# Patient Record
Sex: MALE | Race: WHITE
[De-identification: names, ages, dates, MRNs, and addresses within clinical notes are randomized per-mention and may not be internally consistent; named-entity substitution may affect disease eponyms.]

---

## 2016-08-03 VITALS
SYSTOLIC BLOOD PRESSURE: 109 MMHG | SYSTOLIC BLOOD PRESSURE: 109 MMHG | DIASTOLIC BLOOD PRESSURE: 69 MMHG | DIASTOLIC BLOOD PRESSURE: 69 MMHG

## 2017-08-29 ENCOUNTER — HOSPITAL ENCOUNTER (OUTPATIENT)
Dept: HOSPITAL 44 - LAB | Age: 67
End: 2017-08-29
Attending: NURSE PRACTITIONER
Payer: COMMERCIAL

## 2017-08-29 DIAGNOSIS — M81.0: Primary | ICD-10-CM

## 2017-08-29 PROCEDURE — 82306 VITAMIN D 25 HYDROXY: CPT

## 2017-08-29 PROCEDURE — 84403 ASSAY OF TOTAL TESTOSTERONE: CPT

## 2017-08-29 PROCEDURE — 36415 COLL VENOUS BLD VENIPUNCTURE: CPT

## 2017-08-29 PROCEDURE — 83970 ASSAY OF PARATHORMONE: CPT

## 2017-11-28 ENCOUNTER — HOSPITAL ENCOUNTER (OUTPATIENT)
Dept: HOSPITAL 44 - CARD | Age: 67
End: 2017-11-28
Attending: INTERNAL MEDICINE
Payer: COMMERCIAL

## 2017-11-28 DIAGNOSIS — J43.9: ICD-10-CM

## 2017-11-28 DIAGNOSIS — Z79.899: ICD-10-CM

## 2017-11-28 DIAGNOSIS — Z72.0: ICD-10-CM

## 2017-11-28 DIAGNOSIS — I42.9: Primary | ICD-10-CM

## 2017-11-28 DIAGNOSIS — Z86.79: ICD-10-CM

## 2017-11-28 DIAGNOSIS — Z95.0: ICD-10-CM

## 2018-05-25 ENCOUNTER — HOSPITAL ENCOUNTER (EMERGENCY)
Dept: HOSPITAL 44 - ED | Age: 68
Discharge: HOME | End: 2018-05-25
Payer: MEDICARE

## 2018-05-25 VITALS
DIASTOLIC BLOOD PRESSURE: 68 MMHG | SYSTOLIC BLOOD PRESSURE: 110 MMHG | DIASTOLIC BLOOD PRESSURE: 68 MMHG | SYSTOLIC BLOOD PRESSURE: 110 MMHG | DIASTOLIC BLOOD PRESSURE: 68 MMHG | SYSTOLIC BLOOD PRESSURE: 110 MMHG

## 2018-05-25 DIAGNOSIS — J40: Primary | ICD-10-CM

## 2018-05-25 LAB
BASOPHILS NFR BLD: 0.7 % (ref 0–1.5)
EGFR (AFRICAN): > 60
EGFR (NON-AFRICAN): > 60
EOSINOPHIL NFR BLD: 3.5 % (ref 0–6.8)
MCH RBC QN AUTO: 33.6 PG (ref 28–34)
MCV RBC AUTO: 103.7 FL (ref 80–100)
MONOCYTES %: 6.4 % (ref 0–11)
NEUTROPHILS #: 6 # K/UL (ref 1.4–7.7)

## 2018-05-25 PROCEDURE — 71046 X-RAY EXAM CHEST 2 VIEWS: CPT

## 2018-05-25 PROCEDURE — 99284 EMERGENCY DEPT VISIT MOD MDM: CPT

## 2018-05-25 PROCEDURE — 85025 COMPLETE CBC W/AUTO DIFF WBC: CPT

## 2018-05-25 PROCEDURE — 80053 COMPREHEN METABOLIC PANEL: CPT

## 2018-05-25 PROCEDURE — 36415 COLL VENOUS BLD VENIPUNCTURE: CPT

## 2018-05-25 NOTE — ED PHYSICIAN DOCUMENTATION
General Adult





- HISTORIAN


Historian: patient





- HPI


Stated Complaint: cough x 5 days


Chief Complaint: Cough/ Upper Respiratory


Onset: days ago (5)


Timing: still present


Severity: mild


Further Comments: yes (He states 5 days ago he notes nasal drainge, fever, 

chills, cough (productive) and fatigue . He has not taken any OTC meds . He 

denies any sick contacts. No edema. No other complaints)


Last known Well Code/Unknown Code: Unknown





- ROS


CONST: fever


EYES/ENT: nasal drainage


CVS/RESP: shortness of breath, cough


GI/: none


MS/SKIN/LYMPH: none


NEURO/PSYCH: denies: headache, dizziness





- PAST HX


Past History: hypertension, other (GERD, CAD, )


Surgeries/Procedures: none


Immunizations: UTD


Allergies/Adverse Reactions: 


 Allergies











Allergy/AdvReac Type Severity Reaction Status Date / Time


 


No Known Drug Allergies Allergy   Verified 08/03/16 21:08














Home Medications: 


 Ambulatory Orders











 Medication  Instructions  Recorded


 


Amiodarone HCl [Pacerone] 200 mg PO DAILY 08/03/16


 


Carvedilol [Coreg] 3.125 mg PO DAILY 08/03/16


 


Diclofenac Sodium [Voltaren] 50 mg PO BID 05/25/18


 


Lisinopril [Lisinopril] 5 mg PO DAILY 05/25/18


 


Omeprazole [Omeprazole] 20 mg PO DAILY 05/25/18














- SOCIAL HX


Smoking History: cigarettes


Alcohol Use: none


Drug Use: none





- FAMILY HX


Family History: No





- VITAL SIGNS


Vital Signs: 





 Vital Signs











Temp Pulse Resp BP Pulse Ox


 


          109/69    


 


          08/03/16 21:39   














- REVIEWED ASSESSMENTS


Nursing Assessment  Reviewed: Yes


Vitals Reviewed: Yes





ED Results Lab/Radiology





- Lab Results


Lab Results: 





PA and lateral chest 


Clinical history: CXR, COUGH X5 DAYS, PT STATES HX OF COPD AND SMOKER 


Findings: Examination of the chest in PA and lateral views demonstrate some 

lungs to be hyperinflated but clear. Cardiac silhouette is within normal limits 

and the aorta is atherosclerotic. Bipolar pacemaker overlies left hemithorax. 


Impression: 


1. Hyperinflation. 


2. No active disease. 


Electronically signed on May 25, 2018 3:04:20 PM CDT by: 


Bijan Mills





General Adult Physical Exam





- PHYSICAL EXAM


GENERAL APPEARANCE: no distress


EENT: eye inspection normal, ENT inspection normal


NECK: normal inspection


RESPIRATORY: no resp distress, chest non-tender, wheezes


CVS: reg rate & rhythm, heart sounds normal, equal pulses, no murmur


ABDOMEN: soft, no distension


BACK: normal inspection


SKIN: warm/dry, normal color


EXTREMITIES: non-tender, normal range of motion, no evidence of injury, no edema


NEURO: oriented X3, CN's nml as tested, motor nml, sensation nml, mood/affect 

nml, cognition normal





Discharge


Clincal Impression: 


 Bronchitis





Referrals: 


Arthur Bahena,  [REFERRING] - 2 Days


Comments: 





1. Azithromycin - 250 mg (zpack) take as directed


2. Medrol Dose pack 4 mg - take as directed


3. ProAir 90 mcg Inhale 2 puffs by mouth every 4-6 hours as needed for cough


4. Increase fluids


5. rest 


6. Follow up with PCP in 2-4 days


7. Return to ER for any concerns, increased shortness of air, fever, or other 

concerns 


Condition: Stable


Disposition: 01 HOME, SELF-CARE


Decision to Admit: NO


Date of Decison to Admit: 05/25/18


Decision Time: 15:09

## 2018-05-25 NOTE — DIAGNOSTIC IMAGING REPORT
ELMA SALGADO 

[facility]

[address] [facility] [address]

[place], [place]. [address]

 

 

 

 

Report Submission Date: May 25, 2018 3:04:20 PM CDT

Patient       Study

Name:   MATTHEW CAVAZOS       Date:   May 25, 2018 2:40:11 PM CDT

MRN:   [id number]       Modality Type:   DX

Gender:   M       Description:   CHEST

:   50       Institution:   [facility]

Physician:   ELMA SALGADO

     Accession:    [id number]

 

 

PA and lateral chest 

Clinical history: CXR, COUGH X5 DAYS, PT STATES HX OF COPD AND SMOKER 

Findings: Examination of the chest in PA and lateral views demonstrate some 
lungs to be hyperinflated but clear. Cardiac silhouette is within normal limits 
and the aorta is atherosclerotic. Bipolar pacemaker overlies left hemithorax. 

Impression: 

1. Hyperinflation. 

2. No active disease.

 

Electronically signed on May 25, 2018 3:04:20 PM CDT by:

Bijan DICKSON

## 2018-07-10 ENCOUNTER — HOSPITAL ENCOUNTER (OUTPATIENT)
Dept: HOSPITAL 44 - CARD | Age: 68
End: 2018-07-10
Attending: INTERNAL MEDICINE
Payer: MEDICARE

## 2018-07-10 DIAGNOSIS — J43.9: ICD-10-CM

## 2018-07-10 DIAGNOSIS — Z79.899: ICD-10-CM

## 2018-07-10 DIAGNOSIS — Z95.0: ICD-10-CM

## 2018-07-10 DIAGNOSIS — Z86.79: ICD-10-CM

## 2018-07-10 DIAGNOSIS — I42.9: Primary | ICD-10-CM

## 2018-07-10 DIAGNOSIS — Z72.0: ICD-10-CM

## 2018-07-10 DIAGNOSIS — I48.91: ICD-10-CM

## 2018-09-25 ENCOUNTER — HOSPITAL ENCOUNTER (OUTPATIENT)
Dept: HOSPITAL 44 - CARD | Age: 68
End: 2018-09-25
Attending: INTERNAL MEDICINE
Payer: MEDICARE

## 2018-09-25 DIAGNOSIS — Z72.0: ICD-10-CM

## 2018-09-25 DIAGNOSIS — Z95.0: ICD-10-CM

## 2018-09-25 DIAGNOSIS — J43.9: ICD-10-CM

## 2018-09-25 DIAGNOSIS — I42.9: Primary | ICD-10-CM

## 2018-09-25 DIAGNOSIS — Z86.79: ICD-10-CM

## 2018-11-23 ENCOUNTER — HOSPITAL ENCOUNTER (INPATIENT)
Dept: HOSPITAL 44 - ED | Age: 68
LOS: 2 days | Discharge: HOME | DRG: 193 | End: 2018-11-25
Attending: FAMILY MEDICINE | Admitting: FAMILY MEDICINE
Payer: MEDICARE

## 2018-11-23 VITALS — BODY MASS INDEX: 25.4 KG/M2

## 2018-11-23 DIAGNOSIS — J80: ICD-10-CM

## 2018-11-23 DIAGNOSIS — I50.9: ICD-10-CM

## 2018-11-23 DIAGNOSIS — J18.1: Primary | ICD-10-CM

## 2018-11-23 DIAGNOSIS — F17.210: ICD-10-CM

## 2018-11-23 DIAGNOSIS — J43.9: ICD-10-CM

## 2018-11-23 DIAGNOSIS — R09.02: ICD-10-CM

## 2018-11-23 PROCEDURE — 82550 ASSAY OF CK (CPK): CPT

## 2018-11-23 PROCEDURE — 71046 X-RAY EXAM CHEST 2 VIEWS: CPT

## 2018-11-23 PROCEDURE — 84484 ASSAY OF TROPONIN QUANT: CPT

## 2018-11-23 PROCEDURE — 80048 BASIC METABOLIC PNL TOTAL CA: CPT

## 2018-11-23 PROCEDURE — 87040 BLOOD CULTURE FOR BACTERIA: CPT

## 2018-11-23 PROCEDURE — 85025 COMPLETE CBC W/AUTO DIFF WBC: CPT

## 2018-11-23 PROCEDURE — 99284 EMERGENCY DEPT VISIT MOD MDM: CPT

## 2018-11-23 PROCEDURE — 99238 HOSP IP/OBS DSCHRG MGMT 30/<: CPT

## 2018-11-23 PROCEDURE — 94640 AIRWAY INHALATION TREATMENT: CPT

## 2018-11-23 PROCEDURE — 80053 COMPREHEN METABOLIC PANEL: CPT

## 2018-11-23 PROCEDURE — 83880 ASSAY OF NATRIURETIC PEPTIDE: CPT

## 2018-11-23 PROCEDURE — S1016 NON-PVC INTRAVENOUS ADMINIST: HCPCS

## 2018-11-23 PROCEDURE — 99285 EMERGENCY DEPT VISIT HI MDM: CPT

## 2018-11-23 PROCEDURE — 99222 1ST HOSP IP/OBS MODERATE 55: CPT

## 2018-11-23 PROCEDURE — 82553 CREATINE MB FRACTION: CPT

## 2018-11-23 PROCEDURE — 71045 X-RAY EXAM CHEST 1 VIEW: CPT

## 2018-11-23 PROCEDURE — 93005 ELECTROCARDIOGRAM TRACING: CPT

## 2018-11-23 PROCEDURE — 96365 THER/PROPH/DIAG IV INF INIT: CPT

## 2018-11-24 LAB
BASOPHILS NFR BLD: 0.5 % (ref 0–1.5)
BASOPHILS NFR BLD: 0.8 % (ref 0–1.5)
EGFR (NON-AFRICAN): > 60
EGFR (NON-AFRICAN): > 60
EOSINOPHIL NFR BLD: 1.2 % (ref 0–6.8)
EOSINOPHIL NFR BLD: 2.1 % (ref 0–6.8)
MCH RBC QN AUTO: 33.3 PG (ref 28–34)
MCH RBC QN AUTO: 34.2 PG (ref 28–34)
MCV RBC AUTO: 100 FL (ref 80–100)
MCV RBC AUTO: 99 FL (ref 80–100)
MONOCYTES %: 5.4 % (ref 0–11)
MONOCYTES %: 9.2 % (ref 0–11)
NEUTROPHILS #: 13.8 # K/UL (ref 1.4–7.7)
NEUTROPHILS #: 16.6 # K/UL (ref 1.4–7.7)

## 2018-11-24 RX ADMIN — SODIUM CHLORIDE, PRESERVATIVE FREE SCH ML: 5 INJECTION INTRAVENOUS at 08:20

## 2018-11-24 RX ADMIN — AZITHROMYCIN SCH MG: 250 TABLET, FILM COATED ORAL at 16:02

## 2018-11-24 RX ADMIN — AZITHROMYCIN SCH MG: 250 TABLET, FILM COATED ORAL at 02:29

## 2018-11-24 RX ADMIN — CARVEDILOL SCH MG: 6.25 TABLET, FILM COATED ORAL at 08:16

## 2018-11-24 RX ADMIN — IPRATROPIUM BROMIDE AND ALBUTEROL SULFATE SCH ML: .5; 3 SOLUTION RESPIRATORY (INHALATION) at 12:18

## 2018-11-24 RX ADMIN — CARVEDILOL SCH: 6.25 TABLET, FILM COATED ORAL at 20:55

## 2018-11-24 RX ADMIN — SODIUM CHLORIDE, PRESERVATIVE FREE SCH ML: 5 INJECTION INTRAVENOUS at 20:55

## 2018-11-24 RX ADMIN — ENOXAPARIN SODIUM SCH MG: 30 INJECTION SUBCUTANEOUS at 02:29

## 2018-11-24 RX ADMIN — IPRATROPIUM BROMIDE AND ALBUTEROL SULFATE SCH ML: .5; 3 SOLUTION RESPIRATORY (INHALATION) at 21:22

## 2018-11-24 RX ADMIN — PANTOPRAZOLE SODIUM SCH MG: 40 TABLET, DELAYED RELEASE ORAL at 06:21

## 2018-11-24 RX ADMIN — IPRATROPIUM BROMIDE AND ALBUTEROL SULFATE SCH ML: .5; 3 SOLUTION RESPIRATORY (INHALATION) at 08:23

## 2018-11-24 RX ADMIN — IPRATROPIUM BROMIDE AND ALBUTEROL SULFATE SCH ML: .5; 3 SOLUTION RESPIRATORY (INHALATION) at 17:03

## 2018-11-24 RX ADMIN — LISINOPRIL SCH MG: 5 TABLET ORAL at 08:17

## 2018-11-24 NOTE — HISTORY AND PHYSICAL REPORT
History of Present Illnes





- History of Present Illness


Reason for Visit: dyspnea


History of Present Illness: 





68-year-old white male with a known history of COPD and congestive heart 

failure. Patient states over the last 45 days he is been having increasing 

shortness of breath dyspnea. Patient is normally on oxygen at nighttime. Patient

had started to have to uses oxygen more frequently. Patient developed a 

productive cough up some green phlegm. No home offices was noted. Patient is 

having increase dyspnea with exertion subsequently came to the emergency room 

for evaluation. In the emergency room patient was found to have bilateral 

pneumonia with an elevated white count of 17,000. Patient was noted to be 

hypoxic with and SaO2 in the mid 80s. Patient stated he is had pneumonia on 

numerous occasions previously.Due to his comorbidities of congestive heart 

failure and is COPD patient was subsequently admitted to the hospital for 

further care and evaluation.





- Past Medical History


Cardiac: CHF.  denies: HTN


Pulmonary: COPD





- Past Surgical History


Past Surgical History: Other (right hand surgeries due to trauma, pacemaker 

placement)





- Past Family History


  ** Mother


Family History: CVA,  (70s)





- Past Social History


Smoke: # pack years (90), <1 pack per day (2-4 cirgarette/d)


Alcohol: Occassional (shot several times a week)


Drugs: None


Lives: Alone





- Health Maintenance


Health Maintenance: Pneumococcal Vaccine.  denies: Influenza Vaccine


Influenza Vaccine: No


Pneumonia Vaccine: Yes


Resuscitation Status: 


Resusciation Status





Resuscitation Status             Full Code














Review of Systems





- Review of Systems


Constitutional: Fever, Chills, Sweats


Eyes: negative: pain, vision change


ENT: negative: Ear Pain, Ear Discharge, Nose Pain, Nose Discharge, Nose 

Congestion, Throat Swelling


Respiratory: Shortness of Breath, SOB with Excertion, Sputum (green).  negative:

Hemoptysis


Cardiovascular: negative: Chest Pain, Palpitations, Orthopnea, Edema


Gastrointestinal: negative: Nausea, Vomiting, Abdominal Pain, Diarrhea, 

Constipation, Melena, Hematochezia


Genitourinary: negative: Dysuria, Frequency, Hematuria


Musculoskeletal: negative: Neck Pain, Shoulder Pain, Back Pain


Skin: negative: Rash


Neurological: negative: Weakness, Numbness, Incoordination, Confusion





- Medications/Allergies


Allergies/Adverse Reactions: 


                                    Allergies











Allergy/AdvReac Type Severity Reaction Status Date / Time


 


No Known Drug Allergies Allergy   Verified 18 23:37














Current Inpatient Medications: 


                          Current Inpatient Medications





Albuterol/Ipratropium (Duoneb)  3 ml NEB QID [facility]


Azithromycin (Zithromax)  500 mg PO DAILY [facility]


   Stop: 18 00:52


   Last Admin: 18 02:29 Dose:  500 mg


Carvedilol (Coreg)  3.15 mg PO BID [facility]


Docusate Sodium (Colace)  100 mg PO DAILY PRN


   PRN Reason: Constipation


Enoxaparin Sodium (Lovenox)  30 mg SQ QD [facility]


   Stop: 18 01:01


   Last Admin: 18 02:29 Dose:  30 mg


Ceftriaxone Sodium 1 gm/ (Sodium Chloride)  50 mls @ 100 mls/hr IV Q24H [facility]


Lisinopril (Prinivil)  5 mg PO DAILY [facility]


Methylprednisolone Sodium Succinate (Solu-Medrol)  60 mg IVP Q8 [facility]


Ondansetron HCl (Zofran Odt)  4 mg PO Q8H PRN


   PRN Reason: Nausea / Vomiting


Pantoprazole Sodium (Protonix)  40 mg PO 0700 [facility]


   Last Admin: 18 06:21 Dose:  40 mg


Sodium Chloride (Normal Saline Flush)  3 ml IV BID [facility]














Exam





- Exam


Vital Signs: 


                             Vital Signs (72 hours)











  18





  23:13 00:48 01:07


 


Temperature 98.5 F 100.2 F H 


 


Pulse Rate [ 88 100 H 102 H





Pulse ox]   


 


Respiratory 22 20 20





Rate   


 


Blood Pressure 115/63 133/72 106/59





[Left Arm]   


 


Blood Pressure   





[Right Arm]   


 


O2 Sat by Pulse 86 L  94





Oximetry   














  18





  02:00 06:00


 


Temperature 97.4 F L 97.5 F L


 


Pulse Rate [ 100 H 78





Pulse ox]  


 


Respiratory 20 20





Rate  


 


Blood Pressure 133/72 110/62





[Left Arm]  


 


Blood Pressure 110/68 





[Right Arm]  


 


O2 Sat by Pulse 94 97





Oximetry  














General: Alert, Oriented to Person, Oriented to Place, Oriented to Time, 

Cooperative


HEENT: Atraumatic, PERRLA, EOMI, Mouth Mucous membr. moist/Pink, Nose Mucous 

membr. moist/Pink, Hearing Grossly Normal


Neck: Normal Range of Motion


Carotids: 





WNL


Lungs: Normal air movement, Speaks full Sentences, Respiratory Distress, Rales 

(right posterior)


Cardiovascular: Regular rate, Normal S1, Normal S2, No murmurs


Abdomen: Normal bowel sounds, Soft, No tenderness, No hepatospenomegaly, No 

masses


Integumentary: Normal, Pink, Warm, Dry


Extremities: No clubbing, No cyanosis, No edema, Normal pulses, No 

tenderness/swelling


Neurological: Normal gait, Normal speech, Strength Equal Bilat, Normal tone, 

Sensation intact, Cranial nerves 3-12 NL, Reflexes 2+


Psych/Mental Status: Mental status NL, Mood NL, Appropriate Affect, Intact 

Judgment





- Laboratory Results


Laboratory Results: 


                               Laboratory Results











  18





  23:35 23:35 23:35


 


WBC  17.20 H  


 


RBC  4.07  


 


Hgb  13.9  


 


Hct  40.8  


 


MCV  100.0  


 


MCH  34.2 H  


 


MCHC  34.1  


 


RDW  13.2  


 


Plt Count  146  


 


Neut % (Auto)  80.5 H  


 


Lymph % (Auto)  7.4 L  


 


Mono % (Auto)  9.2  


 


Eos % (Auto)  2.1  


 


Baso % (Auto)  0.8  


 


Neut # (Auto)  13.8 H  


 


Lymph # (Auto)  1.3  


 


Mono # (Auto)  1.6 H  


 


Eos # (Auto)  0.4  


 


Baso # (Auto)  0.1  


 


Sodium   142 


 


Potassium   3.9 


 


Chloride   106 


 


Carbon Dioxide   28 


 


BUN   22 H 


 


Creatinine   0.70 


 


Est GFR ( Amer)   > 60 


 


Est GFR (Non-Af Amer)   > 60 


 


Glucose   112 H 


 


Calcium   8.3 L 


 


Total Bilirubin   0.8 


 


AST   23 


 


ALT   41 


 


Alkaline Phosphatase   86 


 


Troponin I    < 0.03 L


 


NT-Pro-B Natriuret Pep    224.5 H


 


Total Protein   6.7 


 


Albumin   3.6 

















Assessment/Plan





- Assessment/Plan


(1) Bilateral pneumonia


Status: Acute   


Qualifiers: 


   Pneumonia type: due to unspecified organism   Lung location: lower lobe of 

lung   Qualified Code(s): J18.1 - Lobar pneumonia, unspecified organism   





(2) COPD exacerbation


Status: Chronic   





(3) CHF (congestive heart failure)


Status: Chronic   


Qualifiers: 


   Heart failure type: unspecified   Heart failure chronicity: chronic   

Qualified Code(s): I50.9 - Heart failure, unspecified   





VTE Assessment





- RISK FACTOR SCORE


VTE RISK FACTOR SCORES: AGE OVER 60 YEARS, ACUTE RESPIRATORY FAILURE/SEVERE 

COPD, ANTICIPATED BED CONFINEMENT OR IMMOBILIZATION > 24 HOURS





- RISK


VTE HIGH RISK: SCORE OF 3-4 (RISK PROXIMAL DVT 4-8%)  PROPHYLAXIS NEEDED

## 2018-11-24 NOTE — DIAGNOSTIC IMAGING REPORT
JERI SARMIENTO 

[facility]

[address] [facility] [address] [place] [address]. [address]

 

 

 

 

Report Submission Date: 2018 11:58:33 PM CST

Patient       Study

Name:   MATTHEW CAVAZOS       Date:   2018 11:33:59 PM CST

MRN:   [id number]       Modality Type:   DX

Gender:   M       Description:   CHEST

:   50       Institution:   [facility]

Physician:   JERI SARMIENTO

     Accession:    [id number]

 

 

Portable chest 

Clinical history:  Shortness of breath for 2 days. 

Findings:  Examination of the chest in single portable AP view with comparison 
to examination of 2018 demonstrates lungs to be hyperinflated but clear.  
Bipolar pacemaker overlies left hemithorax.  Central pulmonary arteries are 
prominent suggesting pulmonary arterial hypertension. 

Impression: 

1. Emphysema. 

2. Prominence of central pulmonary arteries suggesting pulmonary arterial 
hypertension.

 

Electronically signed on 2018 11:58:33 PM CST by:

Bijan DICKSON

## 2018-11-24 NOTE — DIAGNOSTIC IMAGING REPORT
JERI SARMIENTO 



[facility] [place] [facility]



[address] [facility] [address] [address]



 



 



 



 



Report Submission Date: 2018 7:21:15 AM CST



Patient   Study 

Name: MATTHEW CAVAZOS   Date: 2018 6:50:02 AM CST 

MRN: [id number]   Modality Type: DX 

Gender: M   Description: CHEST 

: 50   Institution: [facility] [place] [facility] 

Physician: JERI SARMIENTO

    Accession:  [id number] 



 



 





PA and lateral chest 



CLINICAL HISTORY:   

Shortness of breath for 2 days.   



FINDINGS:   

Examination of the chest in PA and lateral views with comparison to examination 
from the previous day demonstrates left lower lobe infiltrate in the 
retrocardiac region.  Cardiovascular and mediastinal silhouettes are stable.  
Bipolar pacemaker overlies the left hemithorax.  Monitor leads superimpose the 
chest. 



IMPRESSION:   

Retrocardiac infiltrate the left base seen posteriorly on the lateral view.   

Aortic atherosclerosis and mild cardiomegaly.   

Prominence of the central pulmonary arteries.



 





Electronically signed on 2018 7:21:15 AM CST by:



Bijan DICKSON

## 2018-11-25 VITALS
SYSTOLIC BLOOD PRESSURE: 127 MMHG | DIASTOLIC BLOOD PRESSURE: 65 MMHG | DIASTOLIC BLOOD PRESSURE: 65 MMHG | SYSTOLIC BLOOD PRESSURE: 127 MMHG

## 2018-11-25 LAB
MCH RBC QN AUTO: 33.9 PG (ref 28–34)
MCV RBC AUTO: 99 FL (ref 80–100)
MONOCYTES %: 0 % (ref 0–11)
SEGMENTED NEUTROPHILS %: 73 % (ref 39–79)

## 2018-11-25 RX ADMIN — SODIUM CHLORIDE, PRESERVATIVE FREE SCH ML: 5 INJECTION INTRAVENOUS at 08:46

## 2018-11-25 RX ADMIN — AZITHROMYCIN SCH MG: 250 TABLET, FILM COATED ORAL at 08:46

## 2018-11-25 RX ADMIN — PANTOPRAZOLE SODIUM SCH MG: 40 TABLET, DELAYED RELEASE ORAL at 05:46

## 2018-11-25 RX ADMIN — CEFTRIAXONE SODIUM SCH MLS/HR: 1 INJECTION, POWDER, FOR SOLUTION INTRAMUSCULAR; INTRAVENOUS at 01:31

## 2018-11-25 RX ADMIN — CEFTRIAXONE SODIUM SCH MLS/HR: 1 INJECTION, POWDER, FOR SOLUTION INTRAMUSCULAR; INTRAVENOUS at 08:58

## 2018-11-25 RX ADMIN — LISINOPRIL SCH MG: 5 TABLET ORAL at 08:46

## 2018-11-25 RX ADMIN — ENOXAPARIN SODIUM SCH MG: 30 INJECTION SUBCUTANEOUS at 01:32

## 2018-11-25 RX ADMIN — CARVEDILOL SCH MG: 6.25 TABLET, FILM COATED ORAL at 08:47

## 2018-11-25 NOTE — DISCHARGE SUMMARY
Discharge Summary





- Discharge Sumary


Home Medications: 


                                Ambulatory Orders











 Medication  Instructions  Recorded


 


Carvedilol [Coreg] 3.125 mg PO DAILY 08/03/16


 


Diclofenac Sodium [Voltaren] 50 mg PO BID 05/25/18


 


Lisinopril 5 mg PO DAILY 05/25/18


 


Omeprazole 20 mg PO DAILY 05/25/18


 


Ipratropium/Albuterol Sulfate 3 ml IH TID #90 ampul.neb 11/24/18





[Duoneb]  


 


Nebulizer [Aeroeclipse II] 1 each MC DAILY #1 each 11/24/18


 


Azithromycin 250 mg PO D #4 tablet 11/25/18


 


Cefdinir 300 mg PO BID #14 capsule 11/25/18











Allergies/Adverse Reactions: 


                                    Allergies











Allergy/AdvReac Type Severity Reaction Status Date / Time


 


No Known Drug Allergies Allergy   Verified 11/23/18 23:37











Patient Problems: 


                             Current Active Problems











Problem Status Onset


 


Acute respiratory failure with hypoxia Acute 


 


Bilateral pneumonia Acute 


 


COPD exacerbation Chronic

## 2019-01-01 NOTE — DISCHARGE SUMMARY
Discharge Summary





- Discharge Sumary


History of Present Illness: 





68-year-old white male with a known history of COPD and congestive heart 

failure. Patient states over the last 45 days he is been having increasing 

shortness of breath dyspnea. Patient is normally on oxygen at nighttime. Patient

had started to have to uses oxygen more frequently. Patient developed a 

productive cough up some green phlegm. No home offices was noted. Patient is 

having increase dyspnea with exertion subsequently came to the emergency room 

for evaluation. In the emergency room patient was found to have bilateral 

pneumonia with an elevated white count of 17,000. Patient was noted to be 

hypoxic with and SaO2 in the mid 80s. Patient stated he is had pneumonia on 

numerous occasions previously.Due to his comorbidities of congestive heart 

failure and is COPD patient was subsequently admitted to the hospital for 

further care and evaluation.





Home Medications: 


                                Ambulatory Orders











 Medication  Instructions  Recorded


 


Carvedilol [Coreg] 3.125 mg PO DAILY 08/03/16


 


Diclofenac Sodium [Voltaren] 50 mg PO BID 05/25/18


 


Lisinopril 5 mg PO DAILY 05/25/18


 


Omeprazole 20 mg PO DAILY 05/25/18


 


Ipratropium/Albuterol Sulfate 3 ml IH TID #90 ampul.neb 11/24/18





[Duoneb]  


 


Nebulizer [Aeroeclipse II] 1 each MC DAILY #1 each 11/24/18


 


Azithromycin 250 mg PO D #4 tablet 11/25/18


 


Cefdinir 300 mg PO BID #14 capsule 11/25/18


 


predniSONE [Ke] 5 mg PO AS DIRECTED #72 tablet. 11/25/18











Allergies/Adverse Reactions: 


                                    Allergies











Allergy/AdvReac Type Severity Reaction Status Date / Time


 


No Known Drug Allergies Allergy   Verified 11/23/18 23:37











Discharge Summary: 





Patient was admitted to the hospital started on methylprednisolone on a tapering

 does. At the time to discharge patient was transitioned over to PO prednisone. 

Patient was felt to have bilateral lower lobe pneumonia. Patient was started on 

ceftriaxone and azithromycin ID. Patient was given DuoNeb treatments. Patient 

was reported with oxygen therapy to maintain and SaO2 greater than 92%. Patient 

pulmonary status remains stable. Patient ability to tolerate ambulation and 

exertion improved during his hospitalization. At the time to discharge was felt 

that the patient was stable enough that he could be discharged and followed up 

on an outpatient basis. Patient was discharged in stable condition.





- Final Diagnosis


(1) Bilateral pneumonia


Problems: 


Improved








(2) COPD exacerbation


Problems: 


Improved








(3) CHF (congestive heart failure)


Problems: 


stable

## 2019-04-12 ENCOUNTER — HOSPITAL ENCOUNTER (OUTPATIENT)
Dept: HOSPITAL 44 - LAB | Age: 69
End: 2019-04-12
Attending: NURSE PRACTITIONER
Payer: MEDICARE

## 2019-04-12 DIAGNOSIS — R71.0: Primary | ICD-10-CM

## 2019-04-12 LAB
BASOPHILS NFR BLD: 1.5 % (ref 0–1.5)
EGFR (NON-AFRICAN): > 60
EOSINOPHIL NFR BLD: 3.6 % (ref 0–6.8)
MCH RBC QN AUTO: 32.1 PG (ref 28–34)
MCV RBC AUTO: 97 FL (ref 80–100)
MONOCYTES %: 7.5 % (ref 0–11)
NEUTROPHILS #: 4.5 # K/UL (ref 1.4–7.7)

## 2019-04-12 PROCEDURE — 85025 COMPLETE CBC W/AUTO DIFF WBC: CPT

## 2019-04-12 PROCEDURE — 80053 COMPREHEN METABOLIC PANEL: CPT

## 2019-04-12 PROCEDURE — 36415 COLL VENOUS BLD VENIPUNCTURE: CPT

## 2019-09-24 ENCOUNTER — HOSPITAL ENCOUNTER (EMERGENCY)
Dept: HOSPITAL 44 - ED | Age: 69
Discharge: HOME | End: 2019-09-24
Payer: MEDICARE

## 2019-09-24 VITALS — SYSTOLIC BLOOD PRESSURE: 127 MMHG | DIASTOLIC BLOOD PRESSURE: 65 MMHG

## 2019-09-24 DIAGNOSIS — F17.210: ICD-10-CM

## 2019-09-24 DIAGNOSIS — J44.9: Primary | ICD-10-CM

## 2019-09-24 DIAGNOSIS — J06.9: ICD-10-CM

## 2019-09-24 PROCEDURE — 99282 EMERGENCY DEPT VISIT SF MDM: CPT

## 2019-09-24 PROCEDURE — 99284 EMERGENCY DEPT VISIT MOD MDM: CPT

## 2019-09-24 PROCEDURE — 71046 X-RAY EXAM CHEST 2 VIEWS: CPT

## 2019-09-24 NOTE — ED PHYSICIAN DOCUMENTATION
Upper Respiratory Symptoms





- HISTORIAN


Historian: patient





- HPI


Chief Complaint: Cough/ Upper Respiratory


Additional Information: 


Patient is a 69 year old male who presents to the ER with cough/congestion that 

started this morning. He states, because of his COPD he is quick to get 

pneumonia.  He denies any fever but had some chills this morning. Cough is 

productive. Runny nose and sinus pressure.


Onset: hours


Duration: sudden-Onset


Context: denies: recent foreign travel, multiple patients


Severity: moderate


Associated Symptoms: chills, runny nose, sinus pain, productive cough


Worsened by Deep Breath: No


Further Comments: no





- ROS


CONST/EYES: denies: eye redness, eye itching


CVS/RESP: denies: shortness of breath


LYMPH: denies: swollen glands


GI/: none


NEURO/PSYCH: denies: dizziness


MS/SKIN: denies: muscle aches





- PAST HX


Lung Disease: COPD


PE Risk Factors: hypertension


Other History: cardiac disease


Immunizations: UTD


Allergies/Adverse Reactions: 


                                    Allergies











Allergy/AdvReac Type Severity Reaction Status Date / Time


 


No Known Drug Allergies Allergy   Verified 09/24/19 13:37














Home Medications: 


                                Ambulatory Orders











 Medication  Instructions  Recorded


 


Carvedilol [Coreg] 3.125 mg PO DAILY 08/03/16


 


Diclofenac Sodium [Voltaren] 50 mg PO BID 05/25/18


 


Lisinopril 5 mg PO DAILY 05/25/18


 


Omeprazole 20 mg PO DAILY 05/25/18


 


Nebulizer [Aeroeclipse II] 1 each MC DAILY #1 each 11/24/18


 


Azithromycin [Zithromax] 250 mg PO DAILY #6 tablet 09/24/19


 


Bupropion HCl [Wellbutrin Xl] 150 mg PO DAILY 09/24/19


 


DULoxetine HCL [Cymbalta] 60 mg pe PO DAILY 09/24/19


 


Omeprazole 20 mg PO DAILY 09/24/19


 


Umeclidinium Bromide [Incruse 1 puff INH DAILY 09/24/19





Ellipta]  


 


predniSONE [Deltasone] 40 mg PO DAILY #10 tablet 09/24/19














- SOCIAL HX


Smoking History: cigarettes


Alcohol Use: none


Drug Use: none





- FAMILY HX


Family History: none





- VITAL SIGNS


Vital Signs: 


                                   Vital Signs











Temp Pulse Resp BP Pulse Ox


 


 98.1 F   66   24   127/65   97 


 


 09/24/19 14:35  09/24/19 14:35  09/24/19 14:35  09/24/19 14:35  09/24/19 14:35














- REVIEWED ASSESSMENTS


Nursing Assessment  Reviewed: Yes


Vitals Reviewed: Yes





ED Results Lab/Radiology





- Radiology


Radiology Impressions: 


Exam:  Chest two views. 


History:  Cough and congestion. 


The examination is compared to study dated November 24, 2018. 


A cardiac pacemaker remains in position over the left hemithorax with leads in 

the anticipated right atrium and right ventricle.  Lung fields are well aerated 

without carlos consolidation or effusion.  Heart and mediastinal contour are 

normal.  Old anterior wedge deformity to the T12 vertebral body is noted. 





Impression: 


No carlos consolidation or effusion.





Electronically signed on Sep 24, 2019 2:18:24 PM CDT by:


Ray Garza





- Orders


Orders: 


                                    ED Orders











 Category Date Time Status


 


 CHEST 2VIEW [RAD] Stat Exams  09/24/19 Taken














Upper Respiratory Symptoms





- EXAM


General Appearance: no acute distress, alert


EENT: eyes nml inspection, nml ENT inspection, lids & conjunct. nml, PERRL, ear 

nml, pain over sinuses, nose nml, airway nml


Neck: normal inspection


Respiratory: speaks full sentences, wheezes


Abdomen: non-tender


CVS: heart sounds normal, equal pulses


Skin: color nml, no rash, warm,dry


Extremities: non-tender, normal range of motion


Neuro/Psych: oriented x3, neuro intact, mood/affect nml





Discharge


Clincal Impression: 


 COPD exacerbation, Upper respiratory infection





Prescriptions: 


Azithromycin [Zithromax] 250 mg PO DAILY #6 tablet


predniSONE [Deltasone] 40 mg PO DAILY #10 tablet


Referrals: 


Griselda Dong, PRN [Primary Care Provider] - 2 Days


Additional Instructions: 


Take Zithromax as directed


Prednisone 40mg daily for 5 days


Increase water intake


Follow up with PCP in one week for re-evaluation


Condition: Good


Disposition: 01 HOME, SELF-CARE


Decision to Admit: NO


Decision Time: 15:30

## 2025-06-24 NOTE — DIAGNOSTIC IMAGING REPORT
BETTY LEGER ED 

[facility]

[address] [facility] [address] [facility] [address]

[place], [place]. [address]

 

 

 

 

Report Submission Date: Sep 24, 2019 2:18:24 PM CDT

Patient       Study

Name:   MATTHEW CAVAZOS       Date:   Sep 24, 2019 1:59:30 PM CDT

MRN:   [id number]       Modality Type:   DX

Gender:   M       Description:   CHEST 2VIEW

:   50       Institution:   [facility]

Physician:   BETTY LEGER ED

     Accession:    [id number]

 

 

Exam:  Chest two views.



History:  Cough and congestion.



The examination is compared to study dated 2018.



A cardiac pacemaker remains in position over the left hemithorax with leads in 
the anticipated right atrium and right ventricle.  Lung fields are well aerated 
without carlos consolidation or effusion.  Heart and mediastinal contour are 
normal.  Old anterior wedge deformity to the T12 vertebral body is noted.



Impression:

No carlos consolidation or effusion.

 

Electronically signed on Sep 24, 2019 2:18:24 PM CDT by:

Ray DICKSON Current Medications   Medication Instructions    diphenhydrAMINE (BENADRYL) 25 MG Tab As needed medication, may take if needed, including morning of procedure     allopurinol (ZYLOPRIM) 100 MG Tab Continue taking medication as prescribed, including morning of procedure     vitamin D2, Ergocalciferol, (DRISDOL) 1.25 MG (86817 UT) Cap capsule Stop 7 days before surgery    INDOMETHACIN PO As needed medication, may take if needed, including morning of procedure     Multiple Vitamins-Minerals (MULTI FOR HIM) Cap Stop 7 days before surgery